# Patient Record
Sex: FEMALE | Race: WHITE | NOT HISPANIC OR LATINO | Employment: FULL TIME | URBAN - METROPOLITAN AREA
[De-identification: names, ages, dates, MRNs, and addresses within clinical notes are randomized per-mention and may not be internally consistent; named-entity substitution may affect disease eponyms.]

---

## 2024-06-04 ENCOUNTER — OFFICE VISIT (OUTPATIENT)
Dept: URGENT CARE | Facility: CLINIC | Age: 24
End: 2024-06-04
Payer: MEDICAID

## 2024-06-04 VITALS
OXYGEN SATURATION: 100 % | WEIGHT: 162 LBS | RESPIRATION RATE: 16 BRPM | BODY MASS INDEX: 28.7 KG/M2 | HEIGHT: 63 IN | SYSTOLIC BLOOD PRESSURE: 123 MMHG | TEMPERATURE: 99 F | HEART RATE: 75 BPM | DIASTOLIC BLOOD PRESSURE: 73 MMHG

## 2024-06-04 DIAGNOSIS — R22.9 SKIN NODULE: Primary | ICD-10-CM

## 2024-06-04 PROCEDURE — 99203 OFFICE O/P NEW LOW 30 MIN: CPT | Mod: S$PBB,,, | Performed by: NURSE PRACTITIONER

## 2024-06-04 PROCEDURE — 99205 OFFICE O/P NEW HI 60 MIN: CPT | Mod: PBBFAC | Performed by: NURSE PRACTITIONER

## 2024-06-04 RX ORDER — DEXTROAMPHETAMINE SULFATE, DEXTROAMPHETAMINE SACCHARATE, AMPHETAMINE SULFATE AND AMPHETAMINE ASPARTATE 3.75; 3.75; 3.75; 3.75 MG/1; MG/1; MG/1; MG/1
CAPSULE, EXTENDED RELEASE ORAL
COMMUNITY
Start: 2024-03-05

## 2024-06-04 RX ORDER — TRIAMCINOLONE ACETONIDE 1 MG/G
CREAM TOPICAL 2 TIMES DAILY
Qty: 15 G | Refills: 0 | Status: SHIPPED | OUTPATIENT
Start: 2024-06-04 | End: 2024-06-09

## 2024-06-04 NOTE — PATIENT INSTRUCTIONS
Please follow instructions on patient education material.  Return to urgent care in 2 to 3 days if symptoms are not improving, immediately if you develop any new or worsening symptoms.     Use steroid topical to nodules for 5 days  PCP referral sent today  See patient education for further guidance.  Do not scratch your hands with your fingernails, they can become infected.  RTC for any worsening.  Advised no hydrogen peroxide, alcohol, or kitchen items on the nodules, do not pick at the nodules.

## 2024-06-04 NOTE — PROGRESS NOTES
"Subjective:      Patient ID: Frances Carlisle is a 24 y.o. female.    Vitals:  height is 5' 3" (1.6 m) and weight is 73.5 kg (162 lb). Her temperature is 98.7 °F (37.1 °C). Her blood pressure is 123/73 and her pulse is 75. Her respiration is 16 and oxygen saturation is 100%.     Chief Complaint: Cyst (Multiple "cyst like" areas to bilat upper exts & RT knee x months)    Cyst     As stated in CC.  Patient denies any pain, fever, headache, dizziness, weakness.  Pt reports mother with hx Rheumatoid Arthritis.     Skin:  Negative for erythema.      Objective:     Physical Exam   Constitutional: She is oriented to person, place, and time. She appears well-developed.  Non-toxic appearance. She does not appear ill. No distress.   HENT:   Head: Normocephalic and atraumatic.   Nose: Nose normal. No purulent discharge. Right sinus exhibits no maxillary sinus tenderness and no frontal sinus tenderness. Left sinus exhibits no maxillary sinus tenderness and no frontal sinus tenderness.   Mouth/Throat: Uvula is midline. Mucous membranes are moist.   Eyes: Conjunctivae are normal. Right eye exhibits no discharge. Left eye exhibits no discharge. Extraocular movement intact   Neck: Neck supple. No neck rigidity present.   Cardiovascular: Regular rhythm.   Pulmonary/Chest: Effort normal and breath sounds normal. No respiratory distress. She has no wheezes. She has no rales.   Abdominal: She exhibits no distension. Soft. There is no abdominal tenderness.   Lymphadenopathy:     She has no cervical adenopathy.   Neurological: She is alert and oriented to person, place, and time.   Skin: Skin is warm, dry, not diaphoretic and no rash. Capillary refill takes less than 2 seconds. lesion No erythema         Comments: < 1 cm flesh tone nodules located to bilateral elbows a left hand 2nd finger MIP joint and right knee.  Mild irritation with no erythema noted to joint nodule on left hand.  Otherwise nodules are stable no bleeding, skin " discoloration, discharge or tenderness to palpation   Psychiatric: Her behavior is normal. Mood, judgment and thought content normal.   Nursing note and vitals reviewed.      Assessment:     1. Skin nodule        Plan:   Small skin nodules with minimal irrigation treated with medium potency topical steroid.  PCP referral sent today  Instructed patient to monitor symptoms and return if needed  Skin nodule  -     Ambulatory referral/consult to Internal Medicine    Other orders  -     triamcinolone acetonide 0.1% (KENALOG) 0.1 % cream; Apply topically 2 (two) times daily. for 5 days  Dispense: 15 g; Refill: 0          Medical Decision Making:   Differential Diagnosis:   Molluscum contagiosum

## 2024-06-20 ENCOUNTER — OFFICE VISIT (OUTPATIENT)
Dept: INTERNAL MEDICINE | Facility: CLINIC | Age: 24
End: 2024-06-20
Payer: MEDICAID

## 2024-06-20 ENCOUNTER — LAB VISIT (OUTPATIENT)
Dept: LAB | Facility: HOSPITAL | Age: 24
End: 2024-06-20
Attending: NURSE PRACTITIONER
Payer: MEDICAID

## 2024-06-20 VITALS
HEIGHT: 63 IN | WEIGHT: 168.38 LBS | BODY MASS INDEX: 29.84 KG/M2 | TEMPERATURE: 98 F | RESPIRATION RATE: 16 BRPM | SYSTOLIC BLOOD PRESSURE: 110 MMHG | DIASTOLIC BLOOD PRESSURE: 60 MMHG | HEART RATE: 70 BPM

## 2024-06-20 DIAGNOSIS — Z11.9 SCREENING EXAMINATION FOR INFECTIOUS DISEASE: ICD-10-CM

## 2024-06-20 DIAGNOSIS — Z00.00 WELLNESS EXAMINATION: ICD-10-CM

## 2024-06-20 DIAGNOSIS — L30.9 DERMATITIS: ICD-10-CM

## 2024-06-20 DIAGNOSIS — F90.0 ATTENTION DEFICIT HYPERACTIVITY DISORDER (ADHD), PREDOMINANTLY INATTENTIVE TYPE: ICD-10-CM

## 2024-06-20 DIAGNOSIS — R00.2 PALPITATIONS: ICD-10-CM

## 2024-06-20 DIAGNOSIS — R00.2 PALPITATIONS: Primary | ICD-10-CM

## 2024-06-20 DIAGNOSIS — L43.9 LICHEN PLANUS: ICD-10-CM

## 2024-06-20 LAB
ALBUMIN SERPL-MCNC: 4.1 G/DL (ref 3.5–5)
ALBUMIN/GLOB SERPL: 1.3 RATIO (ref 1.1–2)
ALP SERPL-CCNC: 67 UNIT/L (ref 40–150)
ALT SERPL-CCNC: 13 UNIT/L (ref 0–55)
ANION GAP SERPL CALC-SCNC: 6 MEQ/L
AST SERPL-CCNC: 20 UNIT/L (ref 5–34)
BACTERIA #/AREA URNS AUTO: ABNORMAL /HPF
BASOPHILS # BLD AUTO: 0.05 X10(3)/MCL
BASOPHILS NFR BLD AUTO: 0.8 %
BILIRUB SERPL-MCNC: 0.6 MG/DL
BILIRUB UR QL STRIP.AUTO: NEGATIVE
BUN SERPL-MCNC: 8.6 MG/DL (ref 7–18.7)
CALCIUM SERPL-MCNC: 9.4 MG/DL (ref 8.4–10.2)
CHLORIDE SERPL-SCNC: 107 MMOL/L (ref 98–107)
CHOLEST SERPL-MCNC: 155 MG/DL
CHOLEST/HDLC SERPL: 3 {RATIO} (ref 0–5)
CLARITY UR: CLEAR
CO2 SERPL-SCNC: 25 MMOL/L (ref 22–29)
COLOR UR AUTO: COLORLESS
CREAT SERPL-MCNC: 0.83 MG/DL (ref 0.55–1.02)
CREAT/UREA NIT SERPL: 10
EOSINOPHIL # BLD AUTO: 0.08 X10(3)/MCL (ref 0–0.9)
EOSINOPHIL NFR BLD AUTO: 1.2 %
ERYTHROCYTE [DISTWIDTH] IN BLOOD BY AUTOMATED COUNT: 12.8 % (ref 11.5–17)
EST. AVERAGE GLUCOSE BLD GHB EST-MCNC: 88.2 MG/DL
GFR SERPLBLD CREATININE-BSD FMLA CKD-EPI: >60 ML/MIN/1.73/M2
GLOBULIN SER-MCNC: 3.2 GM/DL (ref 2.4–3.5)
GLUCOSE SERPL-MCNC: 85 MG/DL (ref 74–100)
GLUCOSE UR QL STRIP: NORMAL
HBA1C MFR BLD: 4.7 %
HBV SURFACE AG SERPL QL IA: NONREACTIVE
HCT VFR BLD AUTO: 41.1 % (ref 37–47)
HCV AB SERPL QL IA: NONREACTIVE
HDLC SERPL-MCNC: 62 MG/DL (ref 35–60)
HGB BLD-MCNC: 13.2 G/DL (ref 12–16)
HGB UR QL STRIP: NEGATIVE
HIV 1+2 AB+HIV1 P24 AG SERPL QL IA: NONREACTIVE
HYALINE CASTS #/AREA URNS LPF: ABNORMAL /LPF
IMM GRANULOCYTES # BLD AUTO: 0.02 X10(3)/MCL (ref 0–0.04)
IMM GRANULOCYTES NFR BLD AUTO: 0.3 %
KETONES UR QL STRIP: NEGATIVE
LDLC SERPL CALC-MCNC: 86 MG/DL (ref 50–140)
LEUKOCYTE ESTERASE UR QL STRIP: NEGATIVE
LYMPHOCYTES # BLD AUTO: 1.99 X10(3)/MCL (ref 0.6–4.6)
LYMPHOCYTES NFR BLD AUTO: 30.3 %
MCH RBC QN AUTO: 28.1 PG (ref 27–31)
MCHC RBC AUTO-ENTMCNC: 32.1 G/DL (ref 33–36)
MCV RBC AUTO: 87.4 FL (ref 80–94)
MONOCYTES # BLD AUTO: 0.57 X10(3)/MCL (ref 0.1–1.3)
MONOCYTES NFR BLD AUTO: 8.7 %
NEUTROPHILS # BLD AUTO: 3.85 X10(3)/MCL (ref 2.1–9.2)
NEUTROPHILS NFR BLD AUTO: 58.7 %
NITRITE UR QL STRIP: NEGATIVE
NRBC BLD AUTO-RTO: 0 %
OHS QRS DURATION: 88 MS
OHS QTC CALCULATION: 415 MS
PH UR STRIP: 7 [PH]
PLATELET # BLD AUTO: 238 X10(3)/MCL (ref 130–400)
PMV BLD AUTO: 10.6 FL (ref 7.4–10.4)
POTASSIUM SERPL-SCNC: 4 MMOL/L (ref 3.5–5.1)
PROT SERPL-MCNC: 7.3 GM/DL (ref 6.4–8.3)
PROT UR QL STRIP: NEGATIVE
RBC # BLD AUTO: 4.7 X10(6)/MCL (ref 4.2–5.4)
RBC #/AREA URNS AUTO: ABNORMAL /HPF
SODIUM SERPL-SCNC: 138 MMOL/L (ref 136–145)
SP GR UR STRIP.AUTO: 1.01 (ref 1–1.03)
SQUAMOUS #/AREA URNS LPF: ABNORMAL /HPF
T PALLIDUM AB SER QL: NONREACTIVE
TRIGL SERPL-MCNC: 36 MG/DL (ref 37–140)
TSH SERPL-ACNC: 1.29 UIU/ML (ref 0.35–4.94)
UROBILINOGEN UR STRIP-ACNC: NORMAL
VLDLC SERPL CALC-MCNC: 7 MG/DL
WBC # BLD AUTO: 6.56 X10(3)/MCL (ref 4.5–11.5)
WBC #/AREA URNS AUTO: ABNORMAL /HPF

## 2024-06-20 PROCEDURE — 93005 ELECTROCARDIOGRAM TRACING: CPT

## 2024-06-20 PROCEDURE — 36415 COLL VENOUS BLD VENIPUNCTURE: CPT

## 2024-06-20 PROCEDURE — 80061 LIPID PANEL: CPT

## 2024-06-20 PROCEDURE — 87389 HIV-1 AG W/HIV-1&-2 AB AG IA: CPT

## 2024-06-20 PROCEDURE — 85025 COMPLETE CBC W/AUTO DIFF WBC: CPT

## 2024-06-20 PROCEDURE — 87340 HEPATITIS B SURFACE AG IA: CPT

## 2024-06-20 PROCEDURE — 99215 OFFICE O/P EST HI 40 MIN: CPT | Mod: PBBFAC,25 | Performed by: NURSE PRACTITIONER

## 2024-06-20 PROCEDURE — 83036 HEMOGLOBIN GLYCOSYLATED A1C: CPT

## 2024-06-20 PROCEDURE — 80053 COMPREHEN METABOLIC PANEL: CPT

## 2024-06-20 PROCEDURE — 86803 HEPATITIS C AB TEST: CPT

## 2024-06-20 PROCEDURE — 81001 URINALYSIS AUTO W/SCOPE: CPT

## 2024-06-20 PROCEDURE — 81015 MICROSCOPIC EXAM OF URINE: CPT

## 2024-06-20 PROCEDURE — 86780 TREPONEMA PALLIDUM: CPT

## 2024-06-20 PROCEDURE — 84443 ASSAY THYROID STIM HORMONE: CPT

## 2024-06-20 RX ORDER — BETAMETHASONE VALERATE 1 MG/G
CREAM TOPICAL 2 TIMES DAILY
Qty: 45 G | Refills: 0 | Status: SHIPPED | OUTPATIENT
Start: 2024-06-20

## 2024-06-20 RX ORDER — BETAMETHASONE DIPROPIONATE 0.5 MG/G
OINTMENT TOPICAL 2 TIMES DAILY
Qty: 45 G | Refills: 0 | Status: SHIPPED | OUTPATIENT
Start: 2024-06-20

## 2024-06-20 NOTE — PROGRESS NOTES
Sasha L Trinity, NP   [facility]  [address] [address]      PATIENT NAME: Frances Carlisle  : 2000  DATE: 24  MRN: [id number]        History of Present Illness / Problem Focused Workflow     Frances Carlisle presents to the clinic with Establish Care and Cyst (C/O cyst to fingers)     HPI: 25 yo female to establish PCP care. She states initially cyst to right knee ongoing x 1 year. H/o warts and thought it initially was that but is causing pain to right knee when she accidentally bumps or kneels on right knee. She states unable to kneel on right knee. She also has on left hand and and right elbow and these are non bothersome. Prescribed steroid cream and has been ineffective. Also mentions heaviness in chest, palpitations and elevated heart rate with exertion a few times per week with HR as high as 150 per apple watch. She denies prior EKG or testing for this. She is prescribed Adderall 15 mg once daily. Previously prescribed by former PCP in [place], [place]. Otherwise denies any other concerns or complaints. Denies fever, chills, night sweats, HA, sore throat, blurred vision, tinnitus, dizziness, cough, SOB, abdominal pain, constipation, n/v/d, hematochezia, dysuria, or hematuria. Fasting today and will complete labs. Refuses GYN referral for PAP screening.     Review of Systems     Review of Systems   Constitutional: Negative.    HENT: Negative.     Eyes: Negative.    Respiratory: Negative.     Cardiovascular:  Positive for palpitations.   Gastrointestinal: Negative.    Endocrine: Negative.    Genitourinary: Negative.    Musculoskeletal: Negative.    Skin:  Positive for rash.   Allergic/Immunologic: Negative.    Neurological: Negative.    Hematological: Negative.    Psychiatric/Behavioral: Negative.         Medical / Social / Family History     -------------------------------------    Anxiety disorder, unspecified        Past Surgical  History:   Procedure Laterality Date    SHOULDER SURGERY Right     torn labrum       Social History     Socioeconomic History    Marital status: Single    Number of children: 0    Highest education level: High school graduate   Occupational History     Comment: Works at [employer]   Tobacco Use    Smoking status: Never    Smokeless tobacco: Never    Tobacco comments:     Vaping since 17 years old. Just recently quit 3/4 months ago   Substance and Sexual Activity    Alcohol use: Not Currently     Comment: occ    Drug use: Never    Sexual activity: Yes     Partners: Female     Birth control/protection: None     Social Determinants of Health     Financial Resource Strain: Low Risk  (6/19/2024)    Overall Financial Resource Strain (CARDIA)     Difficulty of Paying Living Expenses: Not very hard   Food Insecurity: No Food Insecurity (6/19/2024)    Hunger Vital Sign     Worried About Running Out of Food in the Last Year: Never true     Ran Out of Food in the Last Year: Never true   Transportation Needs: No Transportation Needs (6/20/2024)    TRANSPORTATION NEEDS     Transportation : No   Physical Activity: Insufficiently Active (6/19/2024)    Exercise Vital Sign     Days of Exercise per Week: 3 days     Minutes of Exercise per Session: 10 min   Stress: Stress Concern Present (6/19/2024)    [employer] [place] [employer] - Occupational Stress Questionnaire     Feeling of Stress : Rather much   Housing Stability: Unknown (6/19/2024)    Housing Stability Vital Sign     Unable to Pay for Housing in the Last Year: No        Family History   Problem Relation Name Age of Onset    Hyperlipidemia Mother      Hypertension Mother      No Known Problems Father          Medications and Allergies     Medications  Current Outpatient Medications   Medication Instructions    ADDERALL XR 15 mg 24 hr capsule Oral    betamethasone dipropionate (DIPROLENE) 0.05 % ointment Topical (Top), 2 times daily    betamethasone valerate 0.1%  "(VALISONE) 0.1 % Crea Topical (Top), 2 times daily    triamcinolone acetonide 0.1% (KENALOG) 0.1 % cream Topical (Top), 2 times daily       Allergies  Review of patient's allergies indicates:   Allergen Reactions    Hydrocodone        Physical Examination     Visit Vitals  /60 (BP Location: Left arm, Patient Position: Sitting, BP Method: Medium (Automatic))   Pulse 70   Temp 98.3 °F (36.8 °C) (Oral)   Resp 16   Ht 5' 2.99" (1.6 m)   Wt 76.4 kg (168 lb 6.4 oz)   LMP 05/08/2024 (Approximate)   BMI 29.84 kg/m²       Physical Exam  Vitals and nursing note reviewed.   Constitutional:       General: She is not in acute distress.     Appearance: Normal appearance. She is not ill-appearing, toxic-appearing or diaphoretic.   HENT:      Head: Normocephalic.      Right Ear: Tympanic membrane, ear canal and external ear normal.      Left Ear: Tympanic membrane, ear canal and external ear normal.      Nose: Nose normal.      Mouth/Throat:      Mouth: Mucous membranes are moist.   Eyes:      Extraocular Movements: Extraocular movements intact.      Conjunctiva/sclera: Conjunctivae normal.      Pupils: Pupils are equal, round, and reactive to light.   Neck:      Thyroid: No thyroid mass, thyromegaly or thyroid tenderness.      Vascular: No carotid bruit.   Cardiovascular:      Rate and Rhythm: Normal rate and regular rhythm.      Pulses: Normal pulses.      Heart sounds: Normal heart sounds. No murmur heard.  Pulmonary:      Effort: Pulmonary effort is normal. No respiratory distress.      Breath sounds: Normal breath sounds. No stridor. No wheezing, rhonchi or rales.   Abdominal:      General: Bowel sounds are normal. There is no distension.      Palpations: Abdomen is soft. There is no mass.      Tenderness: There is no abdominal tenderness. There is no guarding or rebound.      Hernia: No hernia is present.   Musculoskeletal:         General: Normal range of motion.      Cervical back: Neck supple.      Right lower leg: " No edema.      Left lower leg: No edema.   Lymphadenopathy:      Cervical: No cervical adenopathy.   Skin:     General: Skin is warm and dry.      Capillary Refill: Capillary refill takes less than 2 seconds.      Findings: Rash present. Rash is macular and papular.          Neurological:      Mental Status: She is alert and oriented to person, place, and time. Mental status is at baseline.      Motor: No weakness.      Coordination: Coordination normal.      Gait: Gait normal.   Psychiatric:         Mood and Affect: Mood normal.         Behavior: Behavior normal.         Thought Content: Thought content normal.         Judgment: Judgment normal.           Results         Assessment       ICD-10-CM ICD-9-CM   1. Palpitations  R00.2 785.1   2. Dermatitis  L30.9 692.9   3. Wellness examination  Z00.00 V70.0   4. Attention deficit hyperactivity disorder (ADHD), predominantly inattentive type  F90.0 314.00   5. Screening examination for infectious disease  Z11.9 V75.9   6. Lichen planus  L43.9 697.0       Plan       Problem List Items Addressed This Visit          Psychiatric    Attention deficit hyperactivity disorder (ADHD), predominantly inattentive type    Overview     Prescribed per former PCP. Admits to evaluation/ questionnaire being completed. Denies prior EKG.          Current Assessment & Plan     Does not need refill at this time.             Derm    Dermatitis    Current Assessment & Plan     Initially presented on right knee about a year ago small with increase in size and now associated with tenderness with pressure applied to the area.   New areas noted to left hand, right elbow- small but are increasing in size also. Non bothersome to these sites at this time.  Symptoms suggestive of lichen planus ? Will send higher potency topical steroid and referral to Dermatologist, Dr. Trevino in [place] (called office to confirm insurance is accepted)            Relevant Medications    betamethasone valerate 0.1%  (VALISONE) 0.1 % Crea    Other Relevant Orders    Ambulatory referral/consult to Dermatology       Cardiac/Vascular    Palpitations - Primary    Current Assessment & Plan     She reports episodes of palpitations, chest heaviness and tachycardia noted with exertion. Onset about a month ago. On Adderall; denies prior EKG. Will obtain EKG and labs today. Outpatient holter monitor and referral to Cardiology          Relevant Orders    IN OFFICE EKG 12-LEAD (to [facility])    CBC Auto Differential (Completed)    Comprehensive Metabolic Panel (Completed)    Hemoglobin A1C (Completed)    TSH (Completed)    Ambulatory referral/consult to Cardiology    Holter monitor - 48 hour     Other Visit Diagnoses       Wellness examination        Relevant Orders    CBC Auto Differential (Completed)    Comprehensive Metabolic Panel (Completed)    Hemoglobin A1C (Completed)    Lipid Panel (Completed)    TSH (Completed)    Hepatitis C Antibody    Hepatitis B Surface Antigen    HIV 1/2 Ag/Ab (4th Gen) (Completed)    SYPHILIS ANTIBODY (WITH REFLEX RPR) (Completed)    Urinalysis (Completed)    Screening examination for infectious disease        Relevant Orders    Hepatitis C Antibody    Hepatitis B Surface Antigen    HIV 1/2 Ag/Ab (4th Gen) (Completed)    SYPHILIS ANTIBODY (WITH REFLEX RPR) (Completed)    Lichen planus        Relevant Medications    betamethasone dipropionate (DIPROLENE) 0.05 % ointment    Other Relevant Orders    Ambulatory referral/consult to Dermatology            Future Appointments   Date Time Provider Department Center   8/13/2024 10:20 AM Sasha Petersen NP [facility]        Follow up in about 6 weeks (around 8/1/2024) for results .    Signature:  Sasha Petersen NP  [facility]  [address] [address]    Date of encounter: 6/20/24

## 2024-06-20 NOTE — ASSESSMENT & PLAN NOTE
Initially presented on right knee about a year ago small with increase in size and now associated with tenderness with pressure applied to the area.   New areas noted to left hand, right elbow- small but are increasing in size also. Non bothersome to these sites at this time.  Symptoms suggestive of lichen planus ? Will send higher potency topical steroid and referral to Dermatologist, Dr. Trevino in [place] (called office to confirm insurance is accepted)

## 2024-06-26 ENCOUNTER — TELEPHONE (OUTPATIENT)
Dept: FAMILY MEDICINE | Facility: CLINIC | Age: 24
End: 2024-06-26
Payer: MEDICAID

## 2024-06-26 NOTE — TELEPHONE ENCOUNTER
Called patient via phone call and patient understands results given. No further questions at this time.     ----- Message from Sasha Petersen NP sent at 6/25/2024  3:56 PM CDT -----  Please let patient know lab results reviewed with normal/ acceptable levels.

## 2024-07-03 ENCOUNTER — TELEPHONE (OUTPATIENT)
Dept: INTERNAL MEDICINE | Facility: CLINIC | Age: 24
End: 2024-07-03
Payer: MEDICAID

## 2024-07-03 NOTE — TELEPHONE ENCOUNTER
----- Message from Liza Khan MA sent at 7/3/2024  8:58 AM CDT -----  Please advise.  ----- Message -----  From: Belinda Delgadillo  Sent: 7/2/2024   1:49 PM CDT  To: [facility] Staff    .Type:  Patient Requesting Referral    Who Called: pt    Does the patient already have the specialty appointment scheduled?: no    If yes, what is the date of that appointment?: no    Referral to What Specialty: Cardiologist    Reason for Referral: Abnormal EKG    Does the patient want the referral with a specific physician?: any    Is the [facility] an [facility]?: any    Patient Requesting a Response?: yes    Would the patient rather a call back or a response via [facility]?      Best Call Back Number: [phone]    Additional Information:  pt need another referral sent out to a cardiologist that accepts her insurance

## 2024-07-03 NOTE — TELEPHONE ENCOUNTER
Please confirm that [facility] cardiology is not accepting her insurance? If they are not accepting, unfortunately I am not familiar with who will accept her insurance and suggest she contact insurance company for approved accepting providers. Once she locates provider/ location that accepts insurance, please let me know for referral to be ordered.     Thank you

## 2024-07-05 NOTE — TELEPHONE ENCOUNTER
Called [facility] Cardio and  was unaware and stated she would find out and let me know.

## 2024-07-18 ENCOUNTER — TELEPHONE (OUTPATIENT)
Dept: INTERNAL MEDICINE | Facility: CLINIC | Age: 24
End: 2024-07-18
Payer: MEDICAID

## 2024-07-18 NOTE — TELEPHONE ENCOUNTER
Called pt and she stated her previous referral to Cardiology  was denied due to insurance. Pt requesting referral to Cardiologist that accepts her insurance. Please advise.

## 2024-07-18 NOTE — TELEPHONE ENCOUNTER
----- Message from Belinda Senegal sent at 7/18/2024  7:21 AM CDT -----  .Type:  Patient Requesting Referral    Who Called: pt    Does the patient already have the specialty appointment scheduled?: no    If yes, what is the date of that appointment?: no    Referral to What Specialty: Cardiologist    Reason for Referral: fast heart rate    Does the patient want the referral with a specific physician?: [facility]    [facility]     Patient Requesting a Response?: yes    Would the patient rather a call back or a response via [facility]?      Best Call Back Number: [phone]    Additional Information:  please send this referral to someone at [facility] that will accept her insurance thanks

## 2024-07-18 NOTE — TELEPHONE ENCOUNTER
Please call [facility] Cardiology (referral ordered 6/20/24) to inquire regarding this.     Thank you

## 2024-08-13 ENCOUNTER — TELEPHONE (OUTPATIENT)
Dept: INTERNAL MEDICINE | Facility: CLINIC | Age: 24
End: 2024-08-13
Payer: MEDICAID

## 2024-08-13 ENCOUNTER — OFFICE VISIT (OUTPATIENT)
Dept: INTERNAL MEDICINE | Facility: CLINIC | Age: 24
End: 2024-08-13
Payer: MEDICAID

## 2024-08-13 VITALS
WEIGHT: 156.13 LBS | SYSTOLIC BLOOD PRESSURE: 117 MMHG | TEMPERATURE: 98 F | HEART RATE: 55 BPM | RESPIRATION RATE: 18 BRPM | OXYGEN SATURATION: 98 % | DIASTOLIC BLOOD PRESSURE: 67 MMHG | BODY MASS INDEX: 27.66 KG/M2 | HEIGHT: 63 IN

## 2024-08-13 DIAGNOSIS — L30.9 DERMATITIS: ICD-10-CM

## 2024-08-13 DIAGNOSIS — R00.2 PALPITATIONS: Primary | ICD-10-CM

## 2024-08-13 DIAGNOSIS — E78.89 ELEVATED HDL: ICD-10-CM

## 2024-08-13 DIAGNOSIS — Z00.00 WELLNESS EXAMINATION: ICD-10-CM

## 2024-08-13 PROCEDURE — 3078F DIAST BP <80 MM HG: CPT | Mod: CPTII,,, | Performed by: NURSE PRACTITIONER

## 2024-08-13 PROCEDURE — 1159F MED LIST DOCD IN RCRD: CPT | Mod: CPTII,,, | Performed by: NURSE PRACTITIONER

## 2024-08-13 PROCEDURE — 99215 OFFICE O/P EST HI 40 MIN: CPT | Mod: PBBFAC | Performed by: NURSE PRACTITIONER

## 2024-08-13 PROCEDURE — 99214 OFFICE O/P EST MOD 30 MIN: CPT | Mod: S$PBB,,, | Performed by: NURSE PRACTITIONER

## 2024-08-13 PROCEDURE — 3074F SYST BP LT 130 MM HG: CPT | Mod: CPTII,,, | Performed by: NURSE PRACTITIONER

## 2024-08-13 PROCEDURE — 3008F BODY MASS INDEX DOCD: CPT | Mod: CPTII,,, | Performed by: NURSE PRACTITIONER

## 2024-08-13 PROCEDURE — 3044F HG A1C LEVEL LT 7.0%: CPT | Mod: CPTII,,, | Performed by: NURSE PRACTITIONER

## 2024-08-13 PROCEDURE — 1160F RVW MEDS BY RX/DR IN RCRD: CPT | Mod: CPTII,,, | Performed by: NURSE PRACTITIONER

## 2024-08-13 NOTE — TELEPHONE ENCOUNTER
Contacted pt and informed her confirmed with scheduling department insurance is accepted however holter monitor testing will need to be done at another facility other than [facility]. Informed she can report to [facility] for testing, no appointment needed. She is scheduled for Cardiology evaluation with [facility] provider and encouraged her to discuss with provider. She verb understanding and was appreciative of information.

## 2024-08-13 NOTE — ASSESSMENT & PLAN NOTE
Lab Results   Component Value Date    CHOL 155 06/20/2024     Lab Results   Component Value Date    HDL 62 (H) 06/20/2024     Lab Results   Component Value Date    TRIG 36 (L) 06/20/2024     Lab Results   Component Value Date    LDL 86.00 06/20/2024     Continue heart healthy diet and regular exercise

## 2024-08-13 NOTE — PROGRESS NOTES
Sasha L Trinity, NP   [facility]  [address] [address]      PATIENT NAME: Frances Carlisle  : 2000  DATE: 24  MRN: [id number]        History of Present Illness / Problem Focused Workflow     Frances Carlisle presents to the clinic with Follow-up and Results     HPI: 24 Initial visit: 25 yo female to establish PCP care. She states initially cyst to right knee ongoing x 1 year. H/o warts and thought it initially was that but is causing pain to right knee when she accidentally bumps or kneels on right knee. She states unable to kneel on right knee. She also has on left hand and and right elbow and these are non bothersome. Prescribed steroid cream and has been ineffective. Also mentions heaviness in chest, palpitations and elevated heart rate with exertion a few times per week with HR as high as 150 per apple watch. She denies prior EKG or testing for this. She is prescribed Adderall 15 mg once daily. Previously prescribed by former PCP in [place], [place]. Otherwise denies any other concerns or complaints. Denies fever, chills, night sweats, HA, sore throat, blurred vision, tinnitus, dizziness, cough, SOB, abdominal pain, constipation, n/v/d, hematochezia, dysuria, or hematuria. Fasting today and will complete labs. Refuses GYN referral for PAP screening.     24: Pt for follow up after initial visit. She has upcoming appointment with Dermatology next week (Dr. Trevino in [place]). Cysts are the same per pt except left hand growth getting a little bigger. She has appointment with [facility] Cardiology provider tomorrow. She has not been able to complete holter monitor due to insurance issue. She has appointment with Cardiology at [facility] tomorrow for evaluation of heart palpitations. Heart palpitations still same. Denies worsening symptoms. /67. HR 55. Labs reviewed. Appreciates referral to [facility] GYN. No other concerns stated.     Review  What Type Of Note Output Would You Prefer (Optional)?: Standard Output Hpi Title: Evaluation of Skin Lesions of Systems     Review of Systems   Constitutional: Negative.    HENT: Negative.     Eyes: Negative.    Respiratory: Negative.     Cardiovascular:  Positive for palpitations.   Gastrointestinal: Negative.    Endocrine: Negative.    Genitourinary: Negative.    Musculoskeletal: Negative.    Skin: Negative.    Allergic/Immunologic: Negative.    Neurological: Negative.    Hematological: Negative.    Psychiatric/Behavioral: Negative.         Medical / Social / Family History     -------------------------------------    Anxiety disorder, unspecified        Past Surgical History:   Procedure Laterality Date    SHOULDER SURGERY Right     torn labrum       Social History     Socioeconomic History    Marital status: Single    Number of children: 0    Highest education level: High school graduate   Occupational History     Comment: Works at [employer]   Tobacco Use    Smoking status: Never    Smokeless tobacco: Never    Tobacco comments:     Vaping since 17 years old. Just recently quit 3/4 months ago   Substance and Sexual Activity    Alcohol use: Not Currently     Comment: occ    Drug use: Never    Sexual activity: Yes     Partners: Female     Birth control/protection: None     Social Determinants of Health     Financial Resource Strain: Low Risk  (6/19/2024)    Overall Financial Resource Strain (CARDIA)     Difficulty of Paying Living Expenses: Not very hard   Food Insecurity: No Food Insecurity (6/19/2024)    Hunger Vital Sign     Worried About Running Out of Food in the Last Year: Never true     Ran Out of Food in the Last Year: Never true   Transportation Needs: No Transportation Needs (6/20/2024)    TRANSPORTATION NEEDS     Transportation : No   Physical Activity: Insufficiently Active (6/19/2024)    Exercise Vital Sign     Days of Exercise per Week: 3 days     Minutes of Exercise per Session: 10 min   Stress: Stress Concern Present (6/19/2024)    [employer] - Occupational Stress Questionnaire      "Feeling of Stress : Rather much   Housing Stability: Unknown (6/19/2024)    Housing Stability Vital Sign     Unable to Pay for Housing in the Last Year: No        Family History   Problem Relation Name Age of Onset    Hyperlipidemia Mother      Hypertension Mother      No Known Problems Father          Medications and Allergies     Medications  Current Outpatient Medications   Medication Instructions    ADDERALL XR 15 mg 24 hr capsule Oral    betamethasone dipropionate (DIPROLENE) 0.05 % ointment Topical (Top), 2 times daily    betamethasone valerate 0.1% (VALISONE) 0.1 % Crea Topical (Top), 2 times daily    triamcinolone acetonide 0.1% (KENALOG) 0.1 % cream Topical (Top), 2 times daily       Allergies  Review of patient's allergies indicates:   Allergen Reactions    Hydrocodone        Physical Examination     Visit Vitals  /67 (BP Location: Right arm, Patient Position: Sitting, BP Method: Medium (Automatic))   Pulse (!) 55   Temp 98 °F (36.7 °C) (Oral)   Resp 18   Ht 5' 2.99" (1.6 m)   Wt 70.8 kg (156 lb 1.6 oz)   LMP 08/06/2024 (Exact Date)   SpO2 98%   BMI 27.66 kg/m²       Physical Exam  Constitutional:       General: She is not in acute distress.     Appearance: Normal appearance. She is not ill-appearing or diaphoretic.   HENT:      Head: Normocephalic.   Cardiovascular:      Rate and Rhythm: Normal rate.   Pulmonary:      Effort: Pulmonary effort is normal. No respiratory distress.   Skin:     General: Skin is warm and dry.   Neurological:      Mental Status: She is alert and oriented to person, place, and time. Mental status is at baseline.      Coordination: Coordination normal.      Gait: Gait normal.   Psychiatric:         Mood and Affect: Mood normal.         Behavior: Behavior normal.         Thought Content: Thought content normal.         Judgment: Judgment normal.           Results     Lab Results   Component Value Date    WBC 6.56 06/20/2024    RBC 4.70 06/20/2024    HGB 13.2 06/20/2024    HCT " How Severe Are Your Spot(S)?: moderate "41.1 06/20/2024    MCV 87.4 06/20/2024    MCH 28.1 06/20/2024    MCHC 32.1 (L) 06/20/2024    RDW 12.8 06/20/2024     06/20/2024    MPV 10.6 (H) 06/20/2024     Sodium   Date Value Ref Range Status   06/20/2024 138 136 - 145 mmol/L Final     Potassium   Date Value Ref Range Status   06/20/2024 4.0 3.5 - 5.1 mmol/L Final     Chloride   Date Value Ref Range Status   06/20/2024 107 98 - 107 mmol/L Final     CO2   Date Value Ref Range Status   06/20/2024 25 22 - 29 mmol/L Final     Blood Urea Nitrogen   Date Value Ref Range Status   06/20/2024 8.6 7.0 - 18.7 mg/dL Final     Creatinine   Date Value Ref Range Status   06/20/2024 0.83 0.55 - 1.02 mg/dL Final     Calcium   Date Value Ref Range Status   06/20/2024 9.4 8.4 - 10.2 mg/dL Final     Albumin   Date Value Ref Range Status   06/20/2024 4.1 3.5 - 5.0 g/dL Final     Bilirubin Total   Date Value Ref Range Status   06/20/2024 0.6 <=1.5 mg/dL Final     ALP   Date Value Ref Range Status   06/20/2024 67 40 - 150 unit/L Final     AST   Date Value Ref Range Status   06/20/2024 20 5 - 34 unit/L Final     ALT   Date Value Ref Range Status   06/20/2024 13 0 - 55 unit/L Final     Lab Results   Component Value Date    CHOL 155 06/20/2024     Lab Results   Component Value Date    HDL 62 (H) 06/20/2024     Lab Results   Component Value Date    LDL 86.00 06/20/2024       Lab Results   Component Value Date    TRIG 36 (L) 06/20/2024     No results found for: "CHOLHDL"  Lab Results   Component Value Date    TSH 1.294 06/20/2024     Lab Results   Component Value Date    PHUR 7.0 06/20/2024    PROTEINUA Negative 06/20/2024    GLUCUA Normal 06/20/2024    OCCULTUA Negative 06/20/2024    NITRITE Negative 06/20/2024    LEUKOCYTESUR Negative 06/20/2024     Lab Results   Component Value Date    HGBA1C 4.7 06/20/2024     No results found for: "MICALBCREAT"    No results found for this or any previous visit.         Assessment       ICD-10-CM ICD-9-CM   1. Palpitations  R00.2 785.1   2. " Have Your Spot(S) Been Treated In The Past?: has not been treated Wellness examination  Z00.00 V70.0   3. Dermatitis  L30.9 692.9   4. Elevated HDL  E78.89 785.9       Plan       Problem List Items Addressed This Visit          Derm    Dermatitis    Current Assessment & Plan     Appt next week with dermatologist, Dr. Trevino in [place]            Cardiac/Vascular    Elevated HDL    Current Assessment & Plan     Lab Results   Component Value Date    CHOL 155 06/20/2024     Lab Results   Component Value Date    HDL 62 (H) 06/20/2024     Lab Results   Component Value Date    TRIG 36 (L) 06/20/2024     Lab Results   Component Value Date    LDL 86.00 06/20/2024     Continue heart healthy diet and regular exercise           Palpitations - Primary    Current Assessment & Plan     Holter monitor not completed, insurance issue ? Will call to verify   Has appt tomorrow with [facility]   Denies worsening symptoms.           Other Visit Diagnoses       Wellness examination        Relevant Orders    Ambulatory referral/consult to Gynecology    CBC Auto Differential    Comprehensive Metabolic Panel    TSH    Chlamydia/GC, PCR    Urinalysis            Future Appointments   Date Time Provider Department Center   8/14/2024 12:45 PM Bev Stanford FNP [facility]   8/12/2025 11:00 AM Sasha Petersen NP [facility]        Follow up in about 11 months (around 7/13/2025) for wellness with fasting labs before visit.    Signature:  Sasha Petersen NP  [facility]  [address] [address]    Date of encounter: 8/13/24

## 2024-08-13 NOTE — ASSESSMENT & PLAN NOTE
Holter monitor not completed, insurance issue ? Will call to verify   Has appt tomorrow with [facility]iology   Denies worsening symptoms.

## 2024-08-14 ENCOUNTER — OFFICE VISIT (OUTPATIENT)
Dept: CARDIOLOGY | Facility: CLINIC | Age: 24
End: 2024-08-14
Payer: MEDICAID

## 2024-08-14 ENCOUNTER — HOSPITAL ENCOUNTER (OUTPATIENT)
Dept: CARDIOLOGY | Facility: HOSPITAL | Age: 24
Discharge: HOME | End: 2024-08-14
Attending: NURSE PRACTITIONER
Payer: MEDICAID

## 2024-08-14 VITALS
DIASTOLIC BLOOD PRESSURE: 42 MMHG | OXYGEN SATURATION: 100 % | SYSTOLIC BLOOD PRESSURE: 96 MMHG | BODY MASS INDEX: 28.7 KG/M2 | TEMPERATURE: 98 F | RESPIRATION RATE: 20 BRPM | HEART RATE: 86 BPM | WEIGHT: 162 LBS | HEIGHT: 63 IN

## 2024-08-14 DIAGNOSIS — R06.09 DYSPNEA ON EXERTION: Primary | ICD-10-CM

## 2024-08-14 DIAGNOSIS — R00.2 PALPITATIONS: ICD-10-CM

## 2024-08-14 DIAGNOSIS — R07.89 OTHER CHEST PAIN: ICD-10-CM

## 2024-08-14 DIAGNOSIS — F90.0 ATTENTION DEFICIT HYPERACTIVITY DISORDER (ADHD), PREDOMINANTLY INATTENTIVE TYPE: ICD-10-CM

## 2024-08-14 PROCEDURE — 99204 OFFICE O/P NEW MOD 45 MIN: CPT | Mod: S$PBB,,, | Performed by: NURSE PRACTITIONER

## 2024-08-14 PROCEDURE — 3044F HG A1C LEVEL LT 7.0%: CPT | Mod: CPTII,,, | Performed by: NURSE PRACTITIONER

## 2024-08-14 PROCEDURE — 93226 XTRNL ECG REC<48 HR SCAN A/R: CPT

## 2024-08-14 PROCEDURE — 93225 XTRNL ECG REC<48 HRS REC: CPT

## 2024-08-14 PROCEDURE — 1160F RVW MEDS BY RX/DR IN RCRD: CPT | Mod: CPTII,,, | Performed by: NURSE PRACTITIONER

## 2024-08-14 PROCEDURE — 3008F BODY MASS INDEX DOCD: CPT | Mod: CPTII,,, | Performed by: NURSE PRACTITIONER

## 2024-08-14 PROCEDURE — 99215 OFFICE O/P EST HI 40 MIN: CPT | Mod: PBBFAC,25 | Performed by: NURSE PRACTITIONER

## 2024-08-14 PROCEDURE — 3074F SYST BP LT 130 MM HG: CPT | Mod: CPTII,,, | Performed by: NURSE PRACTITIONER

## 2024-08-14 PROCEDURE — 1159F MED LIST DOCD IN RCRD: CPT | Mod: CPTII,,, | Performed by: NURSE PRACTITIONER

## 2024-08-14 PROCEDURE — 3078F DIAST BP <80 MM HG: CPT | Mod: CPTII,,, | Performed by: NURSE PRACTITIONER

## 2024-08-14 NOTE — PROGRESS NOTES
CHIEF COMPLAINT:   Chief Complaint   Patient presents with    New patient for palpitations eval     Patient c/o having palpitations, pressure in chest and SOB on exertion relieved with rest.                                                   HPI:  Frances Carlisle 24 y.o. female with ADHD former tobacco use (vaped - Quit Jan. 2024) who presents to [facility] as an initial referral for palpitations and chest pain.  She denies seeing a prior cardiologist or previous cardiac evaluation. Patient endorses intermittent palpitations that typically occur with activity or whenever she changes positions. Patient describes the palpitations as a sensation of feeling her heart race.  She states the episodes may last up to 30 minutes before eventually subsiding with rest.  She also notes elevated heart rates with more strenuous activities such as mowing her lawn.  The patient reports that her heart rate elevated up to the 150s with strenuous activity per her Apple watch.  However, she does note that her heart rate returned to baseline upon rest.  The patient also reports intermittent episodes of anterior chest tightness that occurs whenever she is moving around or even with routine activity.  She describes it as a midsternal chest tightness with associated shortness of breath.  She denies any associated or aggravating symptoms and states that the symptoms resolved with rest.  The patient also experiences exertional dyspnea with more strenuous activities but is able to perform her routine ADLs without issue.  She denies other cardiovascular complaints of orthopnea, PND, peripheral edema, claudication, dizziness or syncope.  Activity wise, the patient is able to perform her routine ADLs without issue.  However she does endorse experiencing intermittent palpitations and chest pressure with strenuous activities on her job working in the [employer] center at [employer].  The patient does not participate in any formal activity or exercise  but states that she does mow her lawn with a push mower every week and experiences some some chest pain at that time.     Patient denies a significant family history of heart disease, but states that her dad and brother both have heart murmurs.  The patient previously vaped, starting at the age of 17 and recently quit January 2024.        Social History: Formerly vaped. Vaped from age 17. Recently quit Jan. 2024. Denies any illicit drug, ETOH or tobacco use   Family History: Dad and Brother - Heart Murmur                                                                                                                                                                                                                                                                                                                                                                                                                                                                                      CARDIAC TESTING:         Patient Active Problem List   Diagnosis    Palpitations    Dermatitis    Attention deficit hyperactivity disorder (ADHD), predominantly inattentive type    Elevated HDL     Past Surgical History:   Procedure Laterality Date    SHOULDER SURGERY Right     torn labrum     Social History     Socioeconomic History    Marital status: Single    Number of children: 0    Highest education level: High school graduate   Occupational History     Comment: Works at [employer]   Tobacco Use    Smoking status: Never    Smokeless tobacco: Never    Tobacco comments:     Vaping since 17 years old. Just recently quit 3/4 months ago   Substance and Sexual Activity    Alcohol use: Not Currently     Comment: occ    Drug use: Never    Sexual activity: Yes     Partners: Female     Birth control/protection: None     Social Determinants of Health     Financial Resource Strain: Low Risk  (6/19/2024)    Overall Financial Resource Strain (CARDIA)     Difficulty of  "Paying Living Expenses: Not very hard   Food Insecurity: No Food Insecurity (6/19/2024)    Hunger Vital Sign     Worried About Running Out of Food in the Last Year: Never true     Ran Out of Food in the Last Year: Never true   Transportation Needs: No Transportation Needs (6/20/2024)    TRANSPORTATION NEEDS     Transportation : No   Physical Activity: Insufficiently Active (6/19/2024)    Exercise Vital Sign     Days of Exercise per Week: 3 days     Minutes of Exercise per Session: 10 min   Stress: Stress Concern Present (6/19/2024)    [employer] - Occupational Stress Questionnaire     Feeling of Stress : Rather much   Housing Stability: Unknown (6/19/2024)    Housing Stability Vital Sign     Unable to Pay for Housing in the Last Year: No        Family History   Problem Relation Name Age of Onset    Hyperlipidemia Mother      Hypertension Mother      No Known Problems Father       Review of patient's allergies indicates:   Allergen Reactions    Hydrocodone          ROS:  Review of Systems   Constitutional: Negative.    HENT: Negative.     Eyes: Negative.    Respiratory: Negative.  Negative for shortness of breath.    Cardiovascular:  Positive for palpitations.   Gastrointestinal: Negative.    Genitourinary: Negative.    Musculoskeletal: Negative.    Skin: Negative.    Neurological: Negative.    Endo/Heme/Allergies: Negative.    Psychiatric/Behavioral: Negative.                                                                                                                                                                                  Negative except as stated in the history of present illness. See HPI for details.    PHYSICAL EXAM:  Visit Vitals  BP (!) 96/42 (BP Location: Left arm, Patient Position: Sitting, BP Method: Large (Automatic))   Pulse 86   Temp 98.3 °F (36.8 °C) (Oral)   Resp 20   Ht 5' 3" (1.6 m)   Wt 73.5 kg (162 lb)   LMP 08/06/2024 (Exact Date)   SpO2 100%   BMI 28.70 " kg/m²       Physical Exam  HENT:      Head: Normocephalic.      Nose: Nose normal.   Eyes:      Pupils: Pupils are equal, round, and reactive to light.   Cardiovascular:      Rate and Rhythm: Normal rate and regular rhythm.   Pulmonary:      Effort: Pulmonary effort is normal.   Abdominal:      General: Abdomen is flat. Bowel sounds are normal.      Palpations: Abdomen is soft.   Musculoskeletal:         General: Normal range of motion.      Cervical back: Normal range of motion.   Skin:     General: Skin is warm.   Neurological:      General: No focal deficit present.      Mental Status: She is alert.   Psychiatric:         Mood and Affect: Mood normal.     Current Outpatient Medications   Medication Instructions    ADDERALL XR 15 mg 24 hr capsule Oral    betamethasone dipropionate (DIPROLENE) 0.05 % ointment Topical (Top), 2 times daily    betamethasone valerate 0.1% (VALISONE) 0.1 % Crea Topical (Top), 2 times daily    triamcinolone acetonide 0.1% (KENALOG) 0.1 % cream Topical (Top), 2 times daily        All medications, laboratory studies, cardiac diagnostic imaging reviewed.     Lab Results   Component Value Date    LDL 86.00 06/20/2024    TRIG 36 (L) 06/20/2024    CREATININE 0.83 06/20/2024    K 4.0 06/20/2024        ASSESSMENT/PLAN:  Palpitations   - Reports exertional palpitations in which she feels her heart race.  Also notes elevated heart rates with activity that resolves with rest  - Symptoms could be related to Adderall or normal physiologic response, given if symptoms typically occurs with strenuous activity and resolve with rest.  The patient is states that the palpitations are not sustained.  However will definitely obtain a 48 hour Holter monitor to assess for any possible arrhythmias.    - Recent EKG- Sinus Bradycardia, ventricular heart rate 54. No room for beta-blocker  - Advised patient to avoid/decrease any caffeine/stimulant intake and remain well hydrated    Chest Pain - Atypical   - Reports  occasional episodes of midsternal chest pressure with associated shortness of breath that occurs typically with routine activity  - Recent EKG with no acute ischemic changes  - Will obtain a Treadmill stress test to assess for ischemic etiology of symptoms.  Low suspicion    SOB  - Reports SOB with strenuous activities.  - Obtain baseline Echocardiogram to assess LV function and check for any significant valvular abnormalities           ECHO   Treadmill Stress Test   Complete 48 Hour Holter monitored as previously ordered  Follow up in cardiology clinic in 3 months or sooner pending results  Follow up with PCP as directed     There are no diagnoses linked to this encounter.

## 2024-08-14 NOTE — PATIENT INSTRUCTIONS
ECHO   Treadmill Stress Test   Complete 48 Hour Holter monitored as previously ordered  Follow up in cardiology clinic in 3 months or sooner pending results  Follow up with PCP as directed

## 2024-08-17 LAB
OHS CV EVENT MONITOR DAY: 0
OHS CV HOLTER LENGTH DECIMAL HOURS: 48
OHS CV HOLTER LENGTH HOURS: 48
OHS CV HOLTER LENGTH MINUTES: 0
OHS CV HOLTER SINUS AVERAGE HR: 71
OHS CV HOLTER SINUS MAX HR: 118
OHS CV HOLTER SINUS MIN HR: 44

## 2024-08-20 ENCOUNTER — TELEPHONE (OUTPATIENT)
Dept: INTERNAL MEDICINE | Facility: CLINIC | Age: 24
End: 2024-08-20
Payer: MEDICAID

## 2024-08-20 NOTE — TELEPHONE ENCOUNTER
Spoke to pt . Informed her of PCP's message below. Pt verbalized understanding.     ----- Message from Sasha Petersen NP sent at 8/19/2024  3:17 PM CDT -----  Please let pt know Holter monitor results noted with predominant rhythm as sinus.  Normal Sinus Rhythm  No significant arrhythmias, AV block, or pauses.  Encourage to keep f/u with Cardiology.

## 2024-08-20 NOTE — TELEPHONE ENCOUNTER
----- Message from Sasha Petersen NP sent at 8/19/2024  3:17 PM CDT -----  Please let pt know Holter monitor results noted with predominant rhythm as sinus.  Normal Sinus Rhythm  No significant arrhythmias, AV block, or pauses.  Encourage to keep f/u with Cardiology.

## 2024-08-28 NOTE — TELEPHONE ENCOUNTER
----- Message from Alka Degroot sent at 8/28/2024  3:16 PM CDT -----  Who Called: Frances Carlisle    Refill or New Rx:Refill  RX Name and Strength:ADDERALL XR 15 mg 24 hr capsule  How is the patient currently taking it? (ex. 1XDay): 1 xday  Is this a 30 day or 90 day RX: 30  Local or Mail Order: local  List of preferred pharmacies on file (remove unneeded): [unfilled]  If different Pharmacy is requested, enter Pharmacy information here including location and phone number: [id number] [place], [place] - [address] AT [facility] OF [place] [address]   Phone: [phone]  Fax: [phone]    Ordering Provider: ron    Preferred Method of Contact: Phone Call  Patient's Preferred Phone Number on File: [phone]   Best Call Back Number, if different:  Additional Information:  refill request, please advise, thanks

## 2024-08-29 RX ORDER — DEXTROAMPHETAMINE SULFATE, DEXTROAMPHETAMINE SACCHARATE, AMPHETAMINE SULFATE AND AMPHETAMINE ASPARTATE 3.75; 3.75; 3.75; 3.75 MG/1; MG/1; MG/1; MG/1
CAPSULE, EXTENDED RELEASE ORAL
OUTPATIENT
Start: 2024-08-29

## 2024-08-29 NOTE — TELEPHONE ENCOUNTER
I have not prescribed for patient previously. Please confirm if she is seeing outside provider/ psychiatrist who is treating ADHD for her? If not, please encourage if she can to establish with psychiatrist and can contact her insurance company for accepting providers to do so. Also would hold off taking at this time due to ongoing cardiac work up for palpitations. Encourage her to discuss with cardiology once testing complete if okay to continue.     Thank you

## 2024-09-23 RX ORDER — DEXTROAMPHETAMINE SULFATE, DEXTROAMPHETAMINE SACCHARATE, AMPHETAMINE SULFATE AND AMPHETAMINE ASPARTATE 3.75; 3.75; 3.75; 3.75 MG/1; MG/1; MG/1; MG/1
CAPSULE, EXTENDED RELEASE ORAL
OUTPATIENT
Start: 2024-09-23

## 2024-09-23 NOTE — TELEPHONE ENCOUNTER
----- Message from Aminta Miguel sent at 9/23/2024  3:05 PM CDT -----  Regarding: med refill  .Who Called: Frances Carlisle    Refill or New Rx:Refill  RX Name and Strength:ADDERALL XR 15 mg 24 hr capsule   How is the patient currently taking it? (ex. 1XDay):  Is this a 30 day or 90 day RX:  Local or Mail Order:Local  List of preferred pharmacies on file (remove unneeded): [unfilled]  If different Pharmacy is requested, enter Pharmacy information here including location and phone number: [id number] - [place], [place] - [address] AT [facility] [address] [place] [address]   Ordering Provider:Trinity      Preferred Method of Contact: Phone Call  Patient's Preferred Phone Number on File: [phone]   Best Call Back Number, if different:  Additional Information:

## 2024-09-26 ENCOUNTER — TELEPHONE (OUTPATIENT)
Dept: INTERNAL MEDICINE | Facility: CLINIC | Age: 24
End: 2024-09-26

## 2024-09-26 NOTE — TELEPHONE ENCOUNTER
----- Message from Thalia Sparks sent at 9/24/2024  4:00 PM CDT -----  .Who Called: Frances Carlisle    Caller is requesting assistance/information from provider's office.    Symptoms (please be specific): n/a     How long has patient had these symptoms:  n/a    List of preferred pharmacies on file (remove unneeded):   [id number] - [place], [place] - [address] AT [facility] [address] [facility] & [facility]  Phone: [phone]  Fax: [phone]    Preferred Method of Contact: Phone Call    Patient's Preferred Phone Number on File: [phone]     Best Call Back Number, if different:    Additional Information: Pt is calling to f/u on her request for refill on her ADDERALL XR 15 mg 24 hr capsule. Please advise, thank you.

## 2024-10-15 ENCOUNTER — TELEPHONE (OUTPATIENT)
Dept: CARDIOLOGY | Facility: CLINIC | Age: 24
End: 2024-10-15
Payer: MEDICAID

## 2024-10-15 ENCOUNTER — HOSPITAL ENCOUNTER (OUTPATIENT)
Dept: CARDIOLOGY | Facility: HOSPITAL | Age: 24
Discharge: HOME | End: 2024-10-15
Attending: NURSE PRACTITIONER
Payer: MEDICAID

## 2024-10-15 DIAGNOSIS — R07.89 OTHER CHEST PAIN: ICD-10-CM

## 2024-10-15 DIAGNOSIS — R06.09 DYSPNEA ON EXERTION: ICD-10-CM

## 2024-10-15 LAB
APICAL FOUR CHAMBER EJECTION FRACTION: 59 %
APICAL TWO CHAMBER EJECTION FRACTION: 57 %
AV INDEX (PROSTH): 0.72
AV MEAN GRADIENT: 5 MMHG
AV PEAK GRADIENT: 7.8 MMHG
AV VALVE AREA BY VELOCITY RATIO: 2.2 CM²
AV VALVE AREA: 2 CM²
AV VELOCITY RATIO: 0.79
CV ECHO LV RWT: 0.39 CM
DOP CALC AO PEAK VEL: 1.4 M/S
DOP CALC AO VTI: 32.2 CM
DOP CALC LVOT AREA: 2.8 CM2
DOP CALC LVOT DIAMETER: 1.9 CM
DOP CALC LVOT PEAK VEL: 1.1 M/S
DOP CALC LVOT STROKE VOLUME: 65.5 CM3
DOP CALC MV VTI: 43.4 CM
DOP CALCLVOT PEAK VEL VTI: 23.1 CM
E WAVE DECELERATION TIME: 211 MSEC
E/A RATIO: 2.75
E/E' RATIO: 5.95 M/S
ECHO LV POSTERIOR WALL: 0.9 CM (ref 0.6–1.1)
FRACTIONAL SHORTENING: 32.6 % (ref 28–44)
HR MV ECHO: 55 BPM
INTERVENTRICULAR SEPTUM: 0.8 CM (ref 0.6–1.1)
LEFT ATRIUM AREA SYSTOLIC (APICAL 2 CHAMBER): 15 CM2
LEFT ATRIUM AREA SYSTOLIC (APICAL 4 CHAMBER): 15.1 CM2
LEFT ATRIUM SIZE: 3.3 CM
LEFT ATRIUM VOLUME MOD: 36.9 ML
LEFT INTERNAL DIMENSION IN SYSTOLE: 3.1 CM (ref 2.1–4)
LEFT VENTRICLE DIASTOLIC VOLUME: 97.3 ML
LEFT VENTRICLE END DIASTOLIC VOLUME APICAL 2 CHAMBER: 87.8 ML
LEFT VENTRICLE END DIASTOLIC VOLUME APICAL 4 CHAMBER: 87.4 ML
LEFT VENTRICLE END SYSTOLIC VOLUME APICAL 2 CHAMBER: 37.9 ML
LEFT VENTRICLE END SYSTOLIC VOLUME APICAL 4 CHAMBER: 30.9 ML
LEFT VENTRICLE SYSTOLIC VOLUME: 37.9 ML
LEFT VENTRICULAR INTERNAL DIMENSION IN DIASTOLE: 4.6 CM (ref 3.5–6)
LEFT VENTRICULAR MASS: 127.7 G
LV LATERAL E/E' RATIO: 5.24 M/S
LV SEPTAL E/E' RATIO: 6.88 M/S
LVED V (TEICH): 97.3 ML
LVES V (TEICH): 37.9 ML
LVOT MG: 2 MMHG
LVOT MV: 0.7 CM/S
MR PISA EROA: 0.1 CM2
MV MEAN GRADIENT: 2 MMHG
MV PEAK A VEL: 0.4 M/S
MV PEAK E VEL: 1.1 M/S
MV PEAK GRADIENT: 8 MMHG
MV STENOSIS PRESSURE HALF TIME: 94 MS
MV VALVE AREA BY CONTINUITY EQUATION: 1.51 CM2
MV VALVE AREA P 1/2 METHOD: 2.34 CM2
OHS LV EJECTION FRACTION SIMPSONS BIPLANE MOD: 58 %
PISA MRMAX VEL: 4.91 M/S
PISA RADIUS: 0.5 CM
PISA TR MAX VEL: 2.7 M/S
PISA VN NYQUIST MS: 0.31 M/S
PISA VN NYQUIST: 0.31 M/S
RA PRESSURE ESTIMATED: 3 MMHG
RV TB RVSP: 6 MMHG
SINUS: 2.3 CM
TDI LATERAL: 0.21 M/S
TDI SEPTAL: 0.16 M/S
TDI: 0.19 M/S
TR MAX PG: 29 MMHG
TRICUSPID ANNULAR PLANE SYSTOLIC EXCURSION: 2.33 CM
TV REST PULMONARY ARTERY PRESSURE: 32 MMHG

## 2024-10-15 PROCEDURE — 93017 CV STRESS TEST TRACING ONLY: CPT

## 2024-10-15 PROCEDURE — 93306 TTE W/DOPPLER COMPLETE: CPT | Mod: 26,,, | Performed by: INTERNAL MEDICINE

## 2024-10-15 PROCEDURE — 93306 TTE W/DOPPLER COMPLETE: CPT

## 2024-10-15 NOTE — TELEPHONE ENCOUNTER
Discussed results of Echocardiogram with the patient.  All questions answered.  Verbalizes understanding.

## 2024-10-16 ENCOUNTER — TELEPHONE (OUTPATIENT)
Dept: CARDIOLOGY | Facility: CLINIC | Age: 24
End: 2024-10-16
Payer: MEDICAID

## 2024-10-16 LAB
OHS CV CPX 85 PERCENT MAX PREDICTED HEART RATE MALE: 168
OHS CV CPX MAX PREDICTED HEART RATE: 198
OHS CV CPX PATIENT IS FEMALE: 1
OHS CV CPX PATIENT IS MALE: 0
OHS CV CPX PEAK HEAR RATE: 169 BPM
OHS CV CPX PERCENT MAX PREDICTED HEART RATE ACHIEVED: 91

## 2024-10-16 NOTE — TELEPHONE ENCOUNTER
Attempted to notify patient of results of normal treadmill stress test.  No answer.  Voicemail left.

## 2024-10-18 ENCOUNTER — OFFICE VISIT (OUTPATIENT)
Dept: URGENT CARE | Facility: CLINIC | Age: 24
End: 2024-10-18
Payer: MEDICAID

## 2024-10-18 VITALS
DIASTOLIC BLOOD PRESSURE: 76 MMHG | HEIGHT: 63 IN | TEMPERATURE: 98 F | WEIGHT: 172 LBS | HEART RATE: 57 BPM | BODY MASS INDEX: 30.48 KG/M2 | RESPIRATION RATE: 16 BRPM | OXYGEN SATURATION: 100 % | SYSTOLIC BLOOD PRESSURE: 122 MMHG

## 2024-10-18 DIAGNOSIS — J02.9 SORE THROAT: ICD-10-CM

## 2024-10-18 DIAGNOSIS — U07.1 COVID-19: Primary | ICD-10-CM

## 2024-10-18 LAB
CTP QC/QA: YES
CTP QC/QA: YES
MOLECULAR STREP A: NEGATIVE
POC MOLECULAR INFLUENZA A AGN: NEGATIVE
POC MOLECULAR INFLUENZA B AGN: NEGATIVE

## 2024-10-18 PROCEDURE — 99214 OFFICE O/P EST MOD 30 MIN: CPT | Mod: PBBFAC | Performed by: FAMILY MEDICINE

## 2024-10-18 NOTE — PROGRESS NOTES
"Subjective:       Patient ID: Frances Carlisle is a 24 y.o. female.    Vitals:  height is 5' 3" (1.6 m) and weight is 78 kg (172 lb). Her oral temperature is 98.4 °F (36.9 °C). Her blood pressure is 122/76 and her pulse is 57 (abnormal). Her respiration is 16 and oxygen saturation is 100%.     Chief Complaint: URI (X1 day sore throat/Pt tested positive for covid today, requesting strep test./)    Tested positive for COVID yesterday.  Needing an excuse.    URI   This is a new problem. The current episode started yesterday. Associated symptoms include congestion and a sore throat. Pertinent negatives include no chest pain, coughing, neck pain, rash, swollen glands or wheezing.         HENT:  Positive for congestion and sore throat.    Neck: Negative for neck pain.   Cardiovascular:  Negative for chest pain.   Respiratory:  Negative for cough and wheezing.    Skin:  Negative for rash.       Objective:   Physical Exam   Constitutional: She appears well-developed.  Non-toxic appearance. She does not appear ill. No distress.   HENT:   Head: Atraumatic.   Nose: No purulent discharge. Right sinus exhibits no maxillary sinus tenderness and no frontal sinus tenderness. Left sinus exhibits no maxillary sinus tenderness and no frontal sinus tenderness.   Mouth/Throat: Uvula is midline.   Eyes: Right eye exhibits no discharge. Left eye exhibits no discharge. Extraocular movement intact   Neck: Neck supple. No neck rigidity present.   Cardiovascular: Regular rhythm.   Pulmonary/Chest: Effort normal and breath sounds normal. No respiratory distress. She has no wheezes. She has no rhonchi. She has no rales.   Lymphadenopathy:     She has no cervical adenopathy.   Neurological: She is alert.   Skin: Skin is warm, dry and not diaphoretic.   Psychiatric: Her behavior is normal.   Nursing note and vitals reviewed.      Results for orders placed or performed in visit on 10/18/24   POCT Strep A, Molecular    Collection Time: 10/18/24  3:40 " Venous insufficiency of both lower extremities  - wound care consulted   PM   Result Value Ref Range    Molecular Strep A, POC Negative Negative     Acceptable Yes    POCT Influenza A/B Molecular    Collection Time: 10/18/24  3:42 PM   Result Value Ref Range    POC Molecular Influenza A Ag Negative Negative    POC Molecular Influenza B Ag Negative Negative     Acceptable Yes        Assessment:     1. COVID-19    2. Sore throat          Plan:   Discussed COVID-19 isolation instructions, contagious precautions, ER precautions.  Use medications as needed for symptoms.     Please follow instructions on patient education material. Seek care immediately if new or worsening symptoms develop.    COVID-19    Sore throat  -     POCT Influenza A/B Molecular  -     POCT Strep A, Molecular        Please note: This chart was completed via voice to text dictation. It may contain typographical/word recognition errors. If there are any questions, please contact the provider for final clarification.

## 2024-10-18 NOTE — LETTER
October 18, 2024      [facility]  [address] [place] [id number]  Phone: [phone]       Patient: Frances Carlisle   YOB: 2000  Date of Visit: 10/18/2024    To Whom It May Concern:    Eleuterio Carlisle  was at [facility] on 10/18/2024. The patient may return to work/school on 10/21/2024 with no restrictions. If you have any questions or concerns, or if I can be of further assistance, please do not hesitate to contact me.    Sincerely,    DR VIOLA WERNER

## 2024-11-12 ENCOUNTER — PATIENT OUTREACH (OUTPATIENT)
Dept: ADMINISTRATIVE | Facility: HOSPITAL | Age: 24
End: 2024-11-12
Payer: MEDICAID

## 2024-11-19 ENCOUNTER — OFFICE VISIT (OUTPATIENT)
Dept: CARDIOLOGY | Facility: CLINIC | Age: 24
End: 2024-11-19
Payer: MEDICAID

## 2024-11-19 VITALS
SYSTOLIC BLOOD PRESSURE: 97 MMHG | HEIGHT: 62 IN | OXYGEN SATURATION: 99 % | RESPIRATION RATE: 18 BRPM | HEART RATE: 66 BPM | DIASTOLIC BLOOD PRESSURE: 40 MMHG | WEIGHT: 176 LBS | BODY MASS INDEX: 32.39 KG/M2 | TEMPERATURE: 98 F

## 2024-11-19 DIAGNOSIS — R00.2 PALPITATIONS: Primary | ICD-10-CM

## 2024-11-19 PROCEDURE — 3044F HG A1C LEVEL LT 7.0%: CPT | Mod: CPTII,,, | Performed by: NURSE PRACTITIONER

## 2024-11-19 PROCEDURE — 3078F DIAST BP <80 MM HG: CPT | Mod: CPTII,,, | Performed by: NURSE PRACTITIONER

## 2024-11-19 PROCEDURE — 3074F SYST BP LT 130 MM HG: CPT | Mod: CPTII,,, | Performed by: NURSE PRACTITIONER

## 2024-11-19 PROCEDURE — 1159F MED LIST DOCD IN RCRD: CPT | Mod: CPTII,,, | Performed by: NURSE PRACTITIONER

## 2024-11-19 PROCEDURE — 3008F BODY MASS INDEX DOCD: CPT | Mod: CPTII,,, | Performed by: NURSE PRACTITIONER

## 2024-11-19 PROCEDURE — 1160F RVW MEDS BY RX/DR IN RCRD: CPT | Mod: CPTII,,, | Performed by: NURSE PRACTITIONER

## 2024-11-19 PROCEDURE — 99214 OFFICE O/P EST MOD 30 MIN: CPT | Mod: S$PBB,,, | Performed by: NURSE PRACTITIONER

## 2024-11-19 PROCEDURE — 99213 OFFICE O/P EST LOW 20 MIN: CPT | Mod: PBBFAC | Performed by: NURSE PRACTITIONER

## 2024-12-26 ENCOUNTER — TELEPHONE (OUTPATIENT)
Dept: INTERNAL MEDICINE | Facility: CLINIC | Age: 24
End: 2024-12-26
Payer: MEDICAID

## 2024-12-26 DIAGNOSIS — B00.9 HSV (HERPES SIMPLEX VIRUS) INFECTION: Primary | ICD-10-CM

## 2024-12-26 RX ORDER — ACYCLOVIR 400 MG/1
400 TABLET ORAL
Qty: 25 TABLET | Refills: 3 | Status: SHIPPED | OUTPATIENT
Start: 2024-12-26

## 2024-12-26 NOTE — TELEPHONE ENCOUNTER
----- Message from Alka sent at 12/23/2024  3:29 PM CST -----  Who Called: Frances Orestes    Caller is requesting assistance/information from provider's office.    Symptoms (please be specific): core sore    How long has patient had these symptoms:  1 day  List of preferred pharmacies on file (remove unneeded): [unfilled]  If different, enter pharmacy into here including location and phone number: [id number]  [place], [place] - [address] AT [facility] [address] [facility]   Phone: [phone]  Fax: [phone]    Preferred Method of Contact: Phone Call  Patient's Preferred Phone Number on File: [phone]   Best Call Back Number, if different:  Additional Information: medical advice, please, advise, thanks

## 2024-12-30 ENCOUNTER — OFFICE VISIT (OUTPATIENT)
Dept: INTERNAL MEDICINE | Facility: CLINIC | Age: 24
End: 2024-12-30
Payer: MEDICAID

## 2024-12-30 VITALS
HEART RATE: 61 BPM | SYSTOLIC BLOOD PRESSURE: 100 MMHG | TEMPERATURE: 98 F | HEIGHT: 62 IN | WEIGHT: 177.13 LBS | DIASTOLIC BLOOD PRESSURE: 64 MMHG | BODY MASS INDEX: 32.59 KG/M2 | OXYGEN SATURATION: 100 % | RESPIRATION RATE: 18 BRPM

## 2024-12-30 DIAGNOSIS — F90.0 ATTENTION DEFICIT HYPERACTIVITY DISORDER (ADHD), PREDOMINANTLY INATTENTIVE TYPE: Primary | ICD-10-CM

## 2024-12-30 PROCEDURE — 3008F BODY MASS INDEX DOCD: CPT | Mod: CPTII,,, | Performed by: NURSE PRACTITIONER

## 2024-12-30 PROCEDURE — 1159F MED LIST DOCD IN RCRD: CPT | Mod: CPTII,,, | Performed by: NURSE PRACTITIONER

## 2024-12-30 PROCEDURE — 3074F SYST BP LT 130 MM HG: CPT | Mod: CPTII,,, | Performed by: NURSE PRACTITIONER

## 2024-12-30 PROCEDURE — 3078F DIAST BP <80 MM HG: CPT | Mod: CPTII,,, | Performed by: NURSE PRACTITIONER

## 2024-12-30 PROCEDURE — 99214 OFFICE O/P EST MOD 30 MIN: CPT | Mod: PBBFAC | Performed by: NURSE PRACTITIONER

## 2024-12-30 PROCEDURE — 3044F HG A1C LEVEL LT 7.0%: CPT | Mod: CPTII,,, | Performed by: NURSE PRACTITIONER

## 2024-12-30 PROCEDURE — 1160F RVW MEDS BY RX/DR IN RCRD: CPT | Mod: CPTII,,, | Performed by: NURSE PRACTITIONER

## 2024-12-30 PROCEDURE — 99213 OFFICE O/P EST LOW 20 MIN: CPT | Mod: S$PBB,,, | Performed by: NURSE PRACTITIONER

## 2024-12-30 RX ORDER — ONDANSETRON 8 MG/1
8 TABLET, ORALLY DISINTEGRATING ORAL EVERY 8 HOURS
COMMUNITY
Start: 2024-12-26

## 2024-12-30 NOTE — ASSESSMENT & PLAN NOTE
Former PCP providing script but denies being evaluated by mental health for evaluation.   She had cardiac work up- see note 11/19/24.   Pt interested in restarting medication.

## 2024-12-30 NOTE — PROGRESS NOTES
Sasha L Trinity, NP   [facility]  [address] [facility] [address]      PATIENT NAME: Frances Carlisle  : 2000  DATE: 24  MRN: [id number]        History of Present Illness / Problem Focused Workflow     Frances Carlisle presents to the clinic with Medication Refill (Wants refill of Adderall)     25 yo female requesting medication refill. Previously received medication from PCP and has been off until evaluated by Cardiology clinic (see note 24). Would like to start back on Adderall. No other concerns stated.    Other providers   [facility]  Dr. Johnny Trevino, Dermatology in [place]    Review of Systems     Review of Systems   Constitutional: Negative.    HENT: Negative.     Eyes: Negative.    Respiratory: Negative.     Cardiovascular: Negative.    Gastrointestinal: Negative.    Endocrine: Negative.    Genitourinary: Negative.    Musculoskeletal: Negative.    Skin: Negative.    Allergic/Immunologic: Negative.    Neurological: Negative.    Hematological: Negative.    Psychiatric/Behavioral: Negative.         Medical / Social / Family History     -------------------------------------    Anxiety disorder, unspecified        Past Surgical History:   Procedure Laterality Date    SHOULDER SURGERY Right     torn labrum       Social History     Socioeconomic History    Marital status: Single    Number of children: 0    Highest education level: High school graduate   Occupational History     Comment: Works at [employer]   Tobacco Use    Smoking status: Never    Smokeless tobacco: Never    Tobacco comments:     Vaping since 17 years old. Just recently quit 3/4 months ago   Substance and Sexual Activity    Alcohol use: Yes     Comment: occ    Drug use: Never    Sexual activity: Yes     Partners: Female     Birth control/protection: None     Social Drivers of Health     Financial Resource Strain: Low Risk  (2024)    Overall Financial Resource Strain  "(CARDIA)     Difficulty of Paying Living Expenses: Not very hard   Food Insecurity: No Food Insecurity (6/19/2024)    Hunger Vital Sign     Worried About Running Out of Food in the Last Year: Never true     Ran Out of Food in the Last Year: Never true   Transportation Needs: No Transportation Needs (6/20/2024)    TRANSPORTATION NEEDS     Transportation : No   Physical Activity: Insufficiently Active (6/19/2024)    Exercise Vital Sign     Days of Exercise per Week: 3 days     Minutes of Exercise per Session: 10 min   Stress: Stress Concern Present (6/19/2024)    [employer] [employer] - Occupational Stress Questionnaire     Feeling of Stress : Rather much   Housing Stability: Unknown (6/19/2024)    Housing Stability Vital Sign     Unable to Pay for Housing in the Last Year: No        Family History   Problem Relation Name Age of Onset    Hyperlipidemia Mother      Hypertension Mother      No Known Problems Father          Medications and Allergies     Medications  Current Outpatient Medications   Medication Instructions    acyclovir (ZOVIRAX) 400 mg, Oral, 5 times daily    ADDERALL XR 15 mg 24 hr capsule Take by mouth.    betamethasone dipropionate (DIPROLENE) 0.05 % ointment Topical (Top), 2 times daily    betamethasone valerate 0.1% (VALISONE) 0.1 % Crea Topical (Top), 2 times daily    ondansetron (ZOFRAN-ODT) 8 mg, Every 8 hours    triamcinolone acetonide 0.1% (KENALOG) 0.1 % cream Topical (Top), 2 times daily       Allergies  Review of patient's allergies indicates:   Allergen Reactions    Hydrocodone        Physical Examination     Visit Vitals  /64 (BP Location: Left arm, Patient Position: Sitting)   Pulse 61   Temp 98.3 °F (36.8 °C) (Oral)   Resp 18   Ht 5' 2" (1.575 m)   Wt 80.3 kg (177 lb 1.6 oz)   LMP 12/30/2024 (Exact Date)   SpO2 100%   BMI 32.39 kg/m²       Physical Exam  Constitutional:       General: She is not in acute distress.     Appearance: Normal appearance. She is not " ill-appearing or diaphoretic.   HENT:      Head: Normocephalic.   Cardiovascular:      Rate and Rhythm: Normal rate.   Pulmonary:      Effort: Pulmonary effort is normal. No respiratory distress.   Skin:     General: Skin is warm and dry.   Neurological:      Mental Status: She is alert and oriented to person, place, and time. Mental status is at baseline.      Coordination: Coordination normal.      Gait: Gait normal.   Psychiatric:         Mood and Affect: Mood normal.         Behavior: Behavior normal.         Thought Content: Thought content normal.         Judgment: Judgment normal.           Results       Assessment       ICD-10-CM ICD-9-CM   1. Attention deficit hyperactivity disorder (ADHD), predominantly inattentive type  F90.0 314.00       Plan       Problem List Items Addressed This Visit          Psychiatric    Attention deficit hyperactivity disorder (ADHD), predominantly inattentive type - Primary    Overview     Prescribed per former PCP. Admits to evaluation/ questionnaire being completed. Denies prior EKG.          Current Assessment & Plan     Former PCP providing script but denies being evaluated by mental health for evaluation.   She had cardiac work up- see note 11/19/24.   Pt interested in restarting medication.         Relevant Orders    Ambulatory referral/consult to Behavioral Health       Future Appointments   Date Time Provider Department Center   8/12/2025 11:00 AM S[facility] NP [facility]        No follow-ups on file.    Signature:  Sasha Petersen NP  [facility]  [address] [facility] [address]    Date of encounter: 12/30/24

## 2025-08-25 DIAGNOSIS — B00.9 HSV (HERPES SIMPLEX VIRUS) INFECTION: ICD-10-CM

## 2025-08-25 RX ORDER — ACYCLOVIR 400 MG/1
400 TABLET ORAL
Qty: 25 TABLET | Refills: 3 | Status: SHIPPED | OUTPATIENT
Start: 2025-08-25